# Patient Record
Sex: MALE | Race: WHITE | Employment: FULL TIME
[De-identification: names, ages, dates, MRNs, and addresses within clinical notes are randomized per-mention and may not be internally consistent; named-entity substitution may affect disease eponyms.]

---

## 2022-03-19 PROBLEM — E66.01 OBESITY, MORBID (HCC): Status: ACTIVE | Noted: 2019-10-29

## 2024-01-19 ENCOUNTER — OFFICE VISIT (OUTPATIENT)
Dept: INTERNAL MEDICINE CLINIC | Facility: CLINIC | Age: 44
End: 2024-01-19
Payer: COMMERCIAL

## 2024-01-19 VITALS
WEIGHT: 276 LBS | BODY MASS INDEX: 43.32 KG/M2 | HEART RATE: 86 BPM | DIASTOLIC BLOOD PRESSURE: 98 MMHG | SYSTOLIC BLOOD PRESSURE: 142 MMHG | HEIGHT: 67 IN | OXYGEN SATURATION: 98 %

## 2024-01-19 DIAGNOSIS — R10.13 EPIGASTRIC PAIN: ICD-10-CM

## 2024-01-19 DIAGNOSIS — Z82.49 FAMILY HISTORY OF EARLY CAD: ICD-10-CM

## 2024-01-19 DIAGNOSIS — I10 PRIMARY HYPERTENSION: Primary | ICD-10-CM

## 2024-01-19 PROCEDURE — G8417 CALC BMI ABV UP PARAM F/U: HCPCS | Performed by: PHYSICIAN ASSISTANT

## 2024-01-19 PROCEDURE — 99204 OFFICE O/P NEW MOD 45 MIN: CPT | Performed by: PHYSICIAN ASSISTANT

## 2024-01-19 PROCEDURE — 1036F TOBACCO NON-USER: CPT | Performed by: PHYSICIAN ASSISTANT

## 2024-01-19 PROCEDURE — 3080F DIAST BP >= 90 MM HG: CPT | Performed by: PHYSICIAN ASSISTANT

## 2024-01-19 PROCEDURE — 3077F SYST BP >= 140 MM HG: CPT | Performed by: PHYSICIAN ASSISTANT

## 2024-01-19 PROCEDURE — G8427 DOCREV CUR MEDS BY ELIG CLIN: HCPCS | Performed by: PHYSICIAN ASSISTANT

## 2024-01-19 PROCEDURE — 93000 ELECTROCARDIOGRAM COMPLETE: CPT | Performed by: PHYSICIAN ASSISTANT

## 2024-01-19 PROCEDURE — G8484 FLU IMMUNIZE NO ADMIN: HCPCS | Performed by: PHYSICIAN ASSISTANT

## 2024-01-19 RX ORDER — OMEPRAZOLE 40 MG/1
40 CAPSULE, DELAYED RELEASE ORAL
Qty: 30 CAPSULE | Refills: 0 | Status: SHIPPED | OUTPATIENT
Start: 2024-01-19

## 2024-01-19 RX ORDER — LISINOPRIL 10 MG/1
10 TABLET ORAL DAILY
Qty: 30 TABLET | Refills: 3 | Status: SHIPPED | OUTPATIENT
Start: 2024-01-19

## 2024-01-19 NOTE — PROGRESS NOTES
PROGRESS NOTE    Chief Complaint   Patient presents with    Establish Care     Pt here to get established. Pt stated his last ov his B/P was elevated Pt is not fasting and is not currently taking any medication        HPI  Hypertension  This is a chronic problem. The problem is unchanged. The problem is uncontrolled. Pertinent negatives include no chest pain, headaches, neck pain, palpitations or shortness of breath. The current treatment provides no improvement. There are no compliance problems.        Past Medical History, Past Surgical History, Family history, Social History, and Medications were all reviewed with the patient today and updated as necessary.       Current Outpatient Medications   Medication Sig Dispense Refill    lisinopril (PRINIVIL;ZESTRIL) 10 MG tablet Take 1 tablet by mouth daily 30 tablet 3    omeprazole (PRILOSEC) 40 MG delayed release capsule Take 1 capsule by mouth every morning (before breakfast) 30 capsule 0     No current facility-administered medications for this visit.     No Known Allergies  Patient Active Problem List   Diagnosis    Obesity, morbid (HCC)     Past Medical History:   Diagnosis Date    Hypertension      Past Surgical History:   Procedure Laterality Date    TONSILLECTOMY      UROLOGICAL SURGERY      WISDOM TOOTH EXTRACTION       Family History   Problem Relation Age of Onset    Cancer Maternal Uncle         colon cancer    Hypertension Father     Heart Attack Father     Stroke Maternal Grandmother      Social History     Tobacco Use    Smoking status: Never    Smokeless tobacco: Never   Substance Use Topics    Alcohol use: Yes     Alcohol/week: 2.0 standard drinks of alcohol         ROS  Review of Systems   Constitutional:  Negative for fatigue, fever and unexpected weight change.   HENT:  Negative for congestion, ear pain, hearing loss, sore throat, tinnitus and voice change.    Eyes:  Negative for pain and visual disturbance.   Respiratory:  Negative for cough, chest

## 2024-02-16 DIAGNOSIS — R10.13 EPIGASTRIC PAIN: ICD-10-CM

## 2024-02-16 RX ORDER — OMEPRAZOLE 40 MG/1
40 CAPSULE, DELAYED RELEASE ORAL
Qty: 30 CAPSULE | Refills: 0 | Status: SHIPPED | OUTPATIENT
Start: 2024-02-16

## 2024-02-16 NOTE — TELEPHONE ENCOUNTER
Pt is needing his   -Omeprazole 40 mg    Send to   [facility]  [address] [place] [address]   [phone]  --Could you please update this in the system, [facility]    LOV:1/9/24  NOV:3/15/24

## 2024-03-18 DIAGNOSIS — R10.13 EPIGASTRIC PAIN: ICD-10-CM

## 2024-03-18 RX ORDER — OMEPRAZOLE 40 MG/1
40 CAPSULE, DELAYED RELEASE ORAL
Qty: 30 CAPSULE | Refills: 1 | Status: SHIPPED | OUTPATIENT
Start: 2024-03-18

## 2024-05-23 ENCOUNTER — OFFICE VISIT (OUTPATIENT)
Dept: INTERNAL MEDICINE CLINIC | Facility: CLINIC | Age: 44
End: 2024-05-23
Payer: COMMERCIAL

## 2024-05-23 VITALS
SYSTOLIC BLOOD PRESSURE: 142 MMHG | DIASTOLIC BLOOD PRESSURE: 102 MMHG | WEIGHT: 282 LBS | HEIGHT: 67 IN | BODY MASS INDEX: 44.26 KG/M2

## 2024-05-23 DIAGNOSIS — R29.898 IMPAIRED FLEXIBILITY OF LOWER EXTREMITY: ICD-10-CM

## 2024-05-23 DIAGNOSIS — I10 PRIMARY HYPERTENSION: Primary | ICD-10-CM

## 2024-05-23 DIAGNOSIS — R10.13 EPIGASTRIC PAIN: ICD-10-CM

## 2024-05-23 DIAGNOSIS — Z82.49 FAMILY HISTORY OF EARLY CAD: ICD-10-CM

## 2024-05-23 PROCEDURE — 3077F SYST BP >= 140 MM HG: CPT | Performed by: PHYSICIAN ASSISTANT

## 2024-05-23 PROCEDURE — 99214 OFFICE O/P EST MOD 30 MIN: CPT | Performed by: PHYSICIAN ASSISTANT

## 2024-05-23 PROCEDURE — 1036F TOBACCO NON-USER: CPT | Performed by: PHYSICIAN ASSISTANT

## 2024-05-23 PROCEDURE — 3080F DIAST BP >= 90 MM HG: CPT | Performed by: PHYSICIAN ASSISTANT

## 2024-05-23 PROCEDURE — G8427 DOCREV CUR MEDS BY ELIG CLIN: HCPCS | Performed by: PHYSICIAN ASSISTANT

## 2024-05-23 PROCEDURE — G8417 CALC BMI ABV UP PARAM F/U: HCPCS | Performed by: PHYSICIAN ASSISTANT

## 2024-05-23 RX ORDER — LISINOPRIL 20 MG/1
20 TABLET ORAL DAILY
Qty: 30 TABLET | Refills: 5 | Status: CANCELLED | OUTPATIENT
Start: 2024-05-23

## 2024-05-23 RX ORDER — OMEPRAZOLE 40 MG/1
40 CAPSULE, DELAYED RELEASE ORAL
Qty: 30 CAPSULE | Refills: 5 | Status: SHIPPED | OUTPATIENT
Start: 2024-05-23

## 2024-05-23 RX ORDER — LISINOPRIL AND HYDROCHLOROTHIAZIDE 25; 20 MG/1; MG/1
1 TABLET ORAL DAILY
Qty: 30 TABLET | Refills: 5 | Status: SHIPPED | OUTPATIENT
Start: 2024-05-23

## 2024-05-23 ASSESSMENT — ENCOUNTER SYMPTOMS
VOMITING: 0
WHEEZING: 0
DIARRHEA: 0
NAUSEA: 0
VOICE CHANGE: 0
CHEST TIGHTNESS: 0
CONSTIPATION: 0
COUGH: 0
SHORTNESS OF BREATH: 0
SORE THROAT: 0
ABDOMINAL PAIN: 0
COLOR CHANGE: 0
EYE PAIN: 0

## 2024-05-23 ASSESSMENT — PATIENT HEALTH QUESTIONNAIRE - PHQ9
1. LITTLE INTEREST OR PLEASURE IN DOING THINGS: NOT AT ALL
SUM OF ALL RESPONSES TO PHQ QUESTIONS 1-9: 0
SUM OF ALL RESPONSES TO PHQ9 QUESTIONS 1 & 2: 0
SUM OF ALL RESPONSES TO PHQ QUESTIONS 1-9: 0
2. FEELING DOWN, DEPRESSED OR HOPELESS: NOT AT ALL
SUM OF ALL RESPONSES TO PHQ QUESTIONS 1-9: 0
SUM OF ALL RESPONSES TO PHQ QUESTIONS 1-9: 0

## 2024-05-23 NOTE — PROGRESS NOTES
PROGRESS NOTE    Chief Complaint   Patient presents with    Hypertension     4 month f/u on bp. Still running high. Avgs 140s/90s        HPI  Hypertension  This is a chronic problem. The current episode started more than 1 month ago. The problem has been gradually worsening since onset. The problem is uncontrolled. Pertinent negatives include no chest pain, headaches, neck pain, palpitations or shortness of breath. Past treatments include ACE inhibitors. The current treatment provides no improvement.       Past Medical History, Past Surgical History, Family history, Social History, and Medications were all reviewed with the patient today and updated as necessary.       Current Outpatient Medications   Medication Sig Dispense Refill    omeprazole (PRILOSEC) 40 MG delayed release capsule Take 1 capsule by mouth every morning (before breakfast) 30 capsule 5    lisinopril-hydroCHLOROthiazide (PRINZIDE;ZESTORETIC) 20-25 MG per tablet Take 1 tablet by mouth daily 30 tablet 5    lisinopril (PRINIVIL;ZESTRIL) 20 MG tablet Take 1 tablet by mouth daily 30 tablet 0     No current facility-administered medications for this visit.     No Known Allergies  Patient Active Problem List   Diagnosis    Obesity, morbid (HCC)     Past Medical History:   Diagnosis Date    Hypertension      Past Surgical History:   Procedure Laterality Date    TONSILLECTOMY      UROLOGICAL SURGERY      WISDOM TOOTH EXTRACTION       Family History   Problem Relation Age of Onset    Cancer Maternal Uncle         colon cancer    Hypertension Father     Heart Attack Father     Stroke Maternal Grandmother     Cancer Maternal Aunt      Social History     Tobacco Use    Smoking status: Never    Smokeless tobacco: Never   Substance Use Topics    Alcohol use: Yes     Alcohol/week: 1.0 standard drink of alcohol     Types: 1 Cans of beer per week         ROS  Review of Systems   Constitutional:  Negative for fatigue, fever and unexpected weight change.   HENT:

## 2024-06-03 ENCOUNTER — HOSPITAL ENCOUNTER (OUTPATIENT)
Dept: PHYSICAL THERAPY | Age: 44
Setting detail: RECURRING SERIES
Discharge: HOME | End: 2024-06-06
Payer: COMMERCIAL

## 2024-06-03 DIAGNOSIS — M25.60 DECREASED MOBILITY OF JOINT: Primary | ICD-10-CM

## 2024-06-03 PROCEDURE — 97161 PT EVAL LOW COMPLEX 20 MIN: CPT

## 2024-06-03 ASSESSMENT — PAIN SCALES - GENERAL: PAINLEVEL_OUTOF10: 0

## 2024-06-03 NOTE — THERAPY EVALUATION
Anatoliy Benitez  : 1980  Primary: [facility] (Commercial)  Secondary:  [place] [address]  [address]  [facility] [address]  Phone: [phone]  Fax: [phone] Plan Frequency: 1 time per week or as needed  Plan of Care/Certification Expiration Date: 24        Plan of Care/Certification Expiration Date:  Plan of Care/Certification Expiration Date: 24    Frequency/Duration: Plan Frequency: 1 time per week or as needed      Time In/Out:          PT Visit Info:         Visit Count:  1                OUTPATIENT PHYSICAL THERAPY:             Initial Assessment 6/3/2024               Episode (decreased hip mobility)         Treatment Diagnosis:     Decreased mobility of joint  Medical/Referring Diagnosis:    Impaired flexibility of lower extremity [R29.898]    Referring Physician:  Heaven Navas PA MD Orders:  PT Eval and Treat   Return MD Appt:  --  Date of Onset:  Onset Date:  (6 months ago)     Allergies:  Patient has no known allergies.  Restrictions/Precautions:    None      Medications Last Reviewed:  6/3/2024     SUBJECTIVE   History of Injury/Illness (Reason for Referral):  Pt reports noted about 6 mo ago started having difficulty putting on sock and shoes on R foot. Generally does this in standing with foot up on bench.   No pain, just tightness in hip.   Patient Stated Goal(s):  \"more mobility\"  Initial Pain Level:      0/10   Post Session Pain Level:     0/10  Past Medical History/Comorbidities:   Mr. Benitez  has a past medical history of Hypertension.  Mr. Benitez  has a past surgical history that includes Urological Surgery; Tonsillectomy; and [place] tooth extraction.  Social History/Living Environment:   Level of Assistance: Rehab: Independent    Prior Level of Function/Work/Activity:   Employment Status: Employed full time  Occupation:  in industry, prolonged standing on shop floor      Learning:   Does the patient/guardian have any

## 2024-06-03 NOTE — PROGRESS NOTES
Anatoliy Whiteonsunshine Benitez  : 1980  Primary: [facility] (Commercial)  Secondary:  [place] [address]  [address]  [facility] [address]  Phone: [phone]  Fax: [phone] Plan Frequency: 1 time per week or as needed    Plan of Care/Certification Expiration Date: 24        Plan of Care/Certification Expiration Date:  Plan of Care/Certification Expiration Date: 24    Frequency/Duration:   Plan Frequency: 1 time per week or as needed      Time In/Out:          PT Visit Info:         Visit Count:  1    OUTPATIENT PHYSICAL THERAPY:   Treatment Note 6/3/2024       Episode  (decreased hip mobility)               Treatment Diagnosis:    Decreased mobility of joint  Medical/Referring Diagnosis:    Impaired flexibility of lower extremity [R29.898]    Referring Physician:  Heaven Navas PA MD Orders:  PT Eval and Treat   Return MD Appt:     Date of Onset:  Onset Date:  (6 months ago)     Allergies:   Patient has no known allergies.  Restrictions/Precautions:   None      Interventions Planned (Treatment may consist of any combination of the following):     See Assessment Note    Subjective Comments:   Pt reports no pain, just problems with mobility  Initial Pain Level::     0/10  Post Session Pain Level:       0/10  Medications Last Reviewed:  6/3/2024  Updated Objective Findings:  See Evaluation Note from today  Treatment       Therapeutic Exercise ( min): Exercises per grid below to improve mobility and strength.        Date:  6/3 Date:   Date:     Activity/Exercise Parameters Parameters Parameters         Piriformis stretch 15 sec x 3     Single knee to chest 15 sec x 3     JOHN 15 sec x 3                           Manual Therapy ( min): Manual therapy techniques utilized and necessary because of the patient's functional and movement restrictions.     Date:        Techniques utilized              Treatment/Session Summary:    Treatment Assessment:   Pt demonstrated understanding of

## 2024-07-24 ENCOUNTER — OFFICE VISIT (OUTPATIENT)
Dept: INTERNAL MEDICINE CLINIC | Facility: CLINIC | Age: 44
End: 2024-07-24
Payer: COMMERCIAL

## 2024-07-24 VITALS
WEIGHT: 282 LBS | DIASTOLIC BLOOD PRESSURE: 89 MMHG | BODY MASS INDEX: 44.26 KG/M2 | HEIGHT: 67 IN | SYSTOLIC BLOOD PRESSURE: 138 MMHG

## 2024-07-24 DIAGNOSIS — Z82.49 FAMILY HISTORY OF EARLY CAD: ICD-10-CM

## 2024-07-24 DIAGNOSIS — I10 PRIMARY HYPERTENSION: Primary | ICD-10-CM

## 2024-07-24 DIAGNOSIS — R10.13 EPIGASTRIC PAIN: ICD-10-CM

## 2024-07-24 PROCEDURE — 3079F DIAST BP 80-89 MM HG: CPT | Performed by: PHYSICIAN ASSISTANT

## 2024-07-24 PROCEDURE — 3075F SYST BP GE 130 - 139MM HG: CPT | Performed by: PHYSICIAN ASSISTANT

## 2024-07-24 PROCEDURE — 1036F TOBACCO NON-USER: CPT | Performed by: PHYSICIAN ASSISTANT

## 2024-07-24 PROCEDURE — 99214 OFFICE O/P EST MOD 30 MIN: CPT | Performed by: PHYSICIAN ASSISTANT

## 2024-07-24 PROCEDURE — G8427 DOCREV CUR MEDS BY ELIG CLIN: HCPCS | Performed by: PHYSICIAN ASSISTANT

## 2024-07-24 PROCEDURE — G8417 CALC BMI ABV UP PARAM F/U: HCPCS | Performed by: PHYSICIAN ASSISTANT

## 2024-07-24 RX ORDER — OMEPRAZOLE 40 MG/1
40 CAPSULE, DELAYED RELEASE ORAL
Qty: 90 CAPSULE | Refills: 1 | Status: SHIPPED | OUTPATIENT
Start: 2024-07-24

## 2024-07-24 RX ORDER — LISINOPRIL AND HYDROCHLOROTHIAZIDE 25; 20 MG/1; MG/1
1 TABLET ORAL DAILY
Qty: 90 TABLET | Refills: 1 | Status: SHIPPED | OUTPATIENT
Start: 2024-07-24

## 2024-07-24 ASSESSMENT — ENCOUNTER SYMPTOMS
SHORTNESS OF BREATH: 0
WHEEZING: 0
COLOR CHANGE: 0
CONSTIPATION: 0
VOICE CHANGE: 0
NAUSEA: 0
DIARRHEA: 0
CHEST TIGHTNESS: 0
ABDOMINAL PAIN: 0
EYE PAIN: 0
SORE THROAT: 0
VOMITING: 0
COUGH: 0

## 2024-07-24 NOTE — PROGRESS NOTES
PROGRESS NOTE    Chief Complaint   Patient presents with    Hypertension     2 month f/u on bp        HPI  Hypertension  This is a chronic problem. The current episode started more than 1 month ago. The problem is unchanged. The problem is controlled. Pertinent negatives include no chest pain, headaches, neck pain, palpitations or shortness of breath. The current treatment provides mild improvement.       Past Medical History, Past Surgical History, Family history, Social History, and Medications were all reviewed with the patient today and updated as necessary.       Current Outpatient Medications   Medication Sig Dispense Refill    lisinopril-hydroCHLOROthiazide (PRINZIDE;ZESTORETIC) 20-25 MG per tablet Take 1 tablet by mouth daily 90 tablet 1    omeprazole (PRILOSEC) 40 MG delayed release capsule Take 1 capsule by mouth every morning (before breakfast) 90 capsule 1     No current facility-administered medications for this visit.     No Known Allergies  Patient Active Problem List   Diagnosis    Obesity, morbid (HCC)     Past Medical History:   Diagnosis Date    Hypertension      Past Surgical History:   Procedure Laterality Date    TONSILLECTOMY      UROLOGICAL SURGERY      WISDOM TOOTH EXTRACTION       Family History   Problem Relation Age of Onset    Cancer Maternal Uncle         colon cancer    Hypertension Father     Heart Attack Father     Stroke Maternal Grandmother     Cancer Maternal Aunt      Social History     Tobacco Use    Smoking status: Never    Smokeless tobacco: Never   Substance Use Topics    Alcohol use: Yes     Alcohol/week: 1.0 standard drink of alcohol     Types: 1 Cans of beer per week         ROS  Review of Systems   Constitutional:  Negative for fatigue, fever and unexpected weight change.   HENT:  Negative for congestion, ear pain, hearing loss, sore throat, tinnitus and voice change.    Eyes:  Negative for pain and visual disturbance.   Respiratory:  Negative for cough, chest tightness,

## 2024-10-24 ENCOUNTER — OFFICE VISIT (OUTPATIENT)
Dept: INTERNAL MEDICINE CLINIC | Facility: CLINIC | Age: 44
End: 2024-10-24

## 2024-10-24 VITALS
DIASTOLIC BLOOD PRESSURE: 89 MMHG | WEIGHT: 283 LBS | SYSTOLIC BLOOD PRESSURE: 139 MMHG | BODY MASS INDEX: 44.42 KG/M2 | HEIGHT: 67 IN

## 2024-10-24 DIAGNOSIS — E66.813 CLASS 3 SEVERE OBESITY WITH SERIOUS COMORBIDITY AND BODY MASS INDEX (BMI) OF 40.0 TO 44.9 IN ADULT, UNSPECIFIED OBESITY TYPE: ICD-10-CM

## 2024-10-24 DIAGNOSIS — Z82.49 FAMILY HISTORY OF EARLY CAD: ICD-10-CM

## 2024-10-24 DIAGNOSIS — R10.13 EPIGASTRIC PAIN: ICD-10-CM

## 2024-10-24 DIAGNOSIS — E66.01 CLASS 3 SEVERE OBESITY WITH SERIOUS COMORBIDITY AND BODY MASS INDEX (BMI) OF 40.0 TO 44.9 IN ADULT, UNSPECIFIED OBESITY TYPE: ICD-10-CM

## 2024-10-24 DIAGNOSIS — I10 PRIMARY HYPERTENSION: ICD-10-CM

## 2024-10-24 DIAGNOSIS — Z00.00 ROUTINE GENERAL MEDICAL EXAMINATION AT HEALTH CARE FACILITY: Primary | ICD-10-CM

## 2024-10-24 DIAGNOSIS — Z71.3 ENCOUNTER FOR WEIGHT LOSS COUNSELING: ICD-10-CM

## 2024-10-24 LAB
ALBUMIN SERPL-MCNC: 4.5 G/DL (ref 3.5–5)
ALBUMIN/GLOB SERPL: 1.3 (ref 1–1.9)
ALP SERPL-CCNC: 63 U/L (ref 40–129)
ALT SERPL-CCNC: 53 U/L (ref 8–55)
ANION GAP SERPL CALC-SCNC: 11 MMOL/L (ref 9–18)
AST SERPL-CCNC: 37 U/L (ref 15–37)
BASOPHILS # BLD: 0.1 K/UL (ref 0–0.2)
BASOPHILS NFR BLD: 2 % (ref 0–2)
BILIRUB SERPL-MCNC: 0.9 MG/DL (ref 0–1.2)
BUN SERPL-MCNC: 14 MG/DL (ref 6–23)
CALCIUM SERPL-MCNC: 9.4 MG/DL (ref 8.8–10.2)
CHLORIDE SERPL-SCNC: 102 MMOL/L (ref 98–107)
CHOLEST SERPL-MCNC: 181 MG/DL (ref 0–200)
CO2 SERPL-SCNC: 26 MMOL/L (ref 20–28)
CREAT SERPL-MCNC: 0.97 MG/DL (ref 0.8–1.3)
DIFFERENTIAL METHOD BLD: NORMAL
EOSINOPHIL # BLD: 0.3 K/UL (ref 0–0.8)
EOSINOPHIL NFR BLD: 5 % (ref 0.5–7.8)
ERYTHROCYTE [DISTWIDTH] IN BLOOD BY AUTOMATED COUNT: 12.9 % (ref 11.9–14.6)
GLOBULIN SER CALC-MCNC: 3.4 G/DL (ref 2.3–3.5)
GLUCOSE SERPL-MCNC: 89 MG/DL (ref 70–99)
HCT VFR BLD AUTO: 49.3 % (ref 41.1–50.3)
HDLC SERPL-MCNC: 44 MG/DL (ref 40–60)
HDLC SERPL: 4.2 (ref 0–5)
HGB BLD-MCNC: 15.9 G/DL (ref 13.6–17.2)
IMM GRANULOCYTES # BLD AUTO: 0 K/UL (ref 0–0.5)
IMM GRANULOCYTES NFR BLD AUTO: 1 % (ref 0–5)
LDLC SERPL CALC-MCNC: 114 MG/DL (ref 0–100)
LYMPHOCYTES # BLD: 1.7 K/UL (ref 0.5–4.6)
LYMPHOCYTES NFR BLD: 22 % (ref 13–44)
MCH RBC QN AUTO: 29 PG (ref 26.1–32.9)
MCHC RBC AUTO-ENTMCNC: 32.3 G/DL (ref 31.4–35)
MCV RBC AUTO: 89.8 FL (ref 82–102)
MONOCYTES # BLD: 0.6 K/UL (ref 0.1–1.3)
MONOCYTES NFR BLD: 8 % (ref 4–12)
NEUTS SEG # BLD: 4.7 K/UL (ref 1.7–8.2)
NEUTS SEG NFR BLD: 62 % (ref 43–78)
NRBC # BLD: 0 K/UL (ref 0–0.2)
PLATELET # BLD AUTO: 277 K/UL (ref 150–450)
PMV BLD AUTO: 11.4 FL (ref 9.4–12.3)
POTASSIUM SERPL-SCNC: 3.9 MMOL/L (ref 3.5–5.1)
PROT SERPL-MCNC: 7.8 G/DL (ref 6.3–8.2)
RBC # BLD AUTO: 5.49 M/UL (ref 4.23–5.6)
SODIUM SERPL-SCNC: 139 MMOL/L (ref 136–145)
TRIGL SERPL-MCNC: 116 MG/DL (ref 0–150)
TSH, 3RD GENERATION: 2.19 UIU/ML (ref 0.27–4.2)
VLDLC SERPL CALC-MCNC: 23 MG/DL (ref 6–23)
WBC # BLD AUTO: 7.5 K/UL (ref 4.3–11.1)

## 2024-10-24 RX ORDER — OMEPRAZOLE 40 MG/1
40 CAPSULE, DELAYED RELEASE ORAL
Qty: 90 CAPSULE | Refills: 1 | Status: SHIPPED | OUTPATIENT
Start: 2024-10-24

## 2024-10-24 RX ORDER — TIRZEPATIDE 2.5 MG/.5ML
1 INJECTION, SOLUTION SUBCUTANEOUS WEEKLY
Qty: 2 ML | Refills: 0 | Status: SHIPPED | OUTPATIENT
Start: 2024-10-24

## 2024-10-24 RX ORDER — LISINOPRIL AND HYDROCHLOROTHIAZIDE 20; 25 MG/1; MG/1
1 TABLET ORAL DAILY
Qty: 90 TABLET | Refills: 1 | Status: SHIPPED | OUTPATIENT
Start: 2024-10-24

## 2024-10-24 RX ORDER — TIRZEPATIDE 5 MG/.5ML
1 INJECTION, SOLUTION SUBCUTANEOUS WEEKLY
Qty: 2 ML | Refills: 5 | Status: SHIPPED | OUTPATIENT
Start: 2024-10-24

## 2024-10-24 SDOH — ECONOMIC STABILITY: FOOD INSECURITY: WITHIN THE PAST 12 MONTHS, THE FOOD YOU BOUGHT JUST DIDN'T LAST AND YOU DIDN'T HAVE MONEY TO GET MORE.: NEVER TRUE

## 2024-10-24 SDOH — ECONOMIC STABILITY: FOOD INSECURITY: WITHIN THE PAST 12 MONTHS, YOU WORRIED THAT YOUR FOOD WOULD RUN OUT BEFORE YOU GOT MONEY TO BUY MORE.: NEVER TRUE

## 2024-10-24 SDOH — ECONOMIC STABILITY: INCOME INSECURITY: HOW HARD IS IT FOR YOU TO PAY FOR THE VERY BASICS LIKE FOOD, HOUSING, MEDICAL CARE, AND HEATING?: NOT HARD AT ALL

## 2024-10-24 ASSESSMENT — ENCOUNTER SYMPTOMS
COUGH: 0
WHEEZING: 0
VOMITING: 0
SHORTNESS OF BREATH: 0
NAUSEA: 0
COLOR CHANGE: 0
DIARRHEA: 0
SORE THROAT: 0
VOICE CHANGE: 0
EYE PAIN: 0
CONSTIPATION: 0
CHEST TIGHTNESS: 0
ABDOMINAL PAIN: 0

## 2024-10-24 ASSESSMENT — PATIENT HEALTH QUESTIONNAIRE - PHQ9
SUM OF ALL RESPONSES TO PHQ9 QUESTIONS 1 & 2: 0
1. LITTLE INTEREST OR PLEASURE IN DOING THINGS: NOT AT ALL
2. FEELING DOWN, DEPRESSED OR HOPELESS: NOT AT ALL
SUM OF ALL RESPONSES TO PHQ QUESTIONS 1-9: 0

## 2024-10-24 NOTE — PROGRESS NOTES
Neurological:      General: No focal deficit present.      Mental Status: He is alert and oriented to person, place, and time.   Psychiatric:         Mood and Affect: Mood normal.          Medical problems and test results were reviewed with the patient today.     Recent Results (from the past 672 hour(s))   CBC with Auto Differential    Collection Time: 10/24/24 12:31 PM   Result Value Ref Range    WBC 7.5 4.3 - 11.1 K/uL    RBC 5.49 4.23 - 5.6 M/uL    Hemoglobin 15.9 13.6 - 17.2 g/dL    Hematocrit 49.3 41.1 - 50.3 %    MCV 89.8 82 - 102 FL    MCH 29.0 26.1 - 32.9 PG    MCHC 32.3 31.4 - 35.0 g/dL    RDW 12.9 11.9 - 14.6 %    Platelets 277 150 - 450 K/uL    MPV 11.4 9.4 - 12.3 FL    nRBC 0.00 0.0 - 0.2 K/uL    Differential Type AUTOMATED      Neutrophils % 62 43 - 78 %    Lymphocytes % 22 13 - 44 %    Monocytes % 8 4.0 - 12.0 %    Eosinophils % 5 0.5 - 7.8 %    Basophils % 2 0.0 - 2.0 %    Immature Granulocytes % 1 0.0 - 5.0 %    Neutrophils Absolute 4.7 1.7 - 8.2 K/UL    Lymphocytes Absolute 1.7 0.5 - 4.6 K/UL    Monocytes Absolute 0.6 0.1 - 1.3 K/UL    Eosinophils Absolute 0.3 0.0 - 0.8 K/UL    Basophils Absolute 0.1 0.0 - 0.2 K/UL    Immature Granulocytes Absolute 0.0 0.0 - 0.5 K/UL         ASSESSMENT and PLAN  Anatoliy was seen today for annual exam.    Diagnoses and all orders for this visit:    Routine general medical examination at health care facility    Primary hypertension  Comments:  will cont same meds, and will try to loose 10 bls in the next 3 month  Orders:  -     lisinopril-hydroCHLOROthiazide (PRINZIDE;ZESTORETIC) 20-25 MG per tablet; Take 1 tablet by mouth daily  -     Tirzepatide-Weight Management (ZEPBOUND) 2.5 MG/0.5ML SOAJ; Inject 1 Dose into the skin once a week  -     Tirzepatide-Weight Management (ZEPBOUND) 5 MG/0.5ML SOAJ; Inject 1 Dose into the skin once a week    Family history of early CAD  Comments:  pt mandie have her labs today, will aleisha her him with results  Orders:  -

## 2025-01-13 ENCOUNTER — OFFICE VISIT (OUTPATIENT)
Dept: INTERNAL MEDICINE CLINIC | Facility: CLINIC | Age: 45
End: 2025-01-13
Payer: COMMERCIAL

## 2025-01-13 VITALS
HEIGHT: 67 IN | SYSTOLIC BLOOD PRESSURE: 126 MMHG | WEIGHT: 285 LBS | DIASTOLIC BLOOD PRESSURE: 98 MMHG | BODY MASS INDEX: 44.73 KG/M2

## 2025-01-13 DIAGNOSIS — I10 PRIMARY HYPERTENSION: Primary | ICD-10-CM

## 2025-01-13 DIAGNOSIS — Z71.3 ENCOUNTER FOR WEIGHT LOSS COUNSELING: ICD-10-CM

## 2025-01-13 DIAGNOSIS — R10.13 EPIGASTRIC PAIN: ICD-10-CM

## 2025-01-13 PROCEDURE — 99214 OFFICE O/P EST MOD 30 MIN: CPT | Performed by: PHYSICIAN ASSISTANT

## 2025-01-13 PROCEDURE — 3080F DIAST BP >= 90 MM HG: CPT | Performed by: PHYSICIAN ASSISTANT

## 2025-01-13 PROCEDURE — 1036F TOBACCO NON-USER: CPT | Performed by: PHYSICIAN ASSISTANT

## 2025-01-13 PROCEDURE — 3074F SYST BP LT 130 MM HG: CPT | Performed by: PHYSICIAN ASSISTANT

## 2025-01-13 PROCEDURE — G8427 DOCREV CUR MEDS BY ELIG CLIN: HCPCS | Performed by: PHYSICIAN ASSISTANT

## 2025-01-13 PROCEDURE — G8417 CALC BMI ABV UP PARAM F/U: HCPCS | Performed by: PHYSICIAN ASSISTANT

## 2025-01-13 RX ORDER — LISINOPRIL AND HYDROCHLOROTHIAZIDE 20; 25 MG/1; MG/1
1 TABLET ORAL DAILY
Qty: 90 TABLET | Refills: 1 | Status: SHIPPED | OUTPATIENT
Start: 2025-01-13

## 2025-01-13 SDOH — ECONOMIC STABILITY: FOOD INSECURITY: WITHIN THE PAST 12 MONTHS, YOU WORRIED THAT YOUR FOOD WOULD RUN OUT BEFORE YOU GOT MONEY TO BUY MORE.: PATIENT DECLINED

## 2025-01-13 SDOH — ECONOMIC STABILITY: FOOD INSECURITY: WITHIN THE PAST 12 MONTHS, THE FOOD YOU BOUGHT JUST DIDN'T LAST AND YOU DIDN'T HAVE MONEY TO GET MORE.: PATIENT DECLINED

## 2025-01-13 ASSESSMENT — PATIENT HEALTH QUESTIONNAIRE - PHQ9
SUM OF ALL RESPONSES TO PHQ QUESTIONS 1-9: 0
SUM OF ALL RESPONSES TO PHQ9 QUESTIONS 1 & 2: 0
SUM OF ALL RESPONSES TO PHQ QUESTIONS 1-9: 0
1. LITTLE INTEREST OR PLEASURE IN DOING THINGS: NOT AT ALL
SUM OF ALL RESPONSES TO PHQ QUESTIONS 1-9: 0
SUM OF ALL RESPONSES TO PHQ QUESTIONS 1-9: 0
2. FEELING DOWN, DEPRESSED OR HOPELESS: NOT AT ALL

## 2025-01-13 ASSESSMENT — ENCOUNTER SYMPTOMS
SORE THROAT: 0
SHORTNESS OF BREATH: 0
NAUSEA: 0
EYE PAIN: 0
ABDOMINAL PAIN: 0
CHEST TIGHTNESS: 0
VOICE CHANGE: 0
COUGH: 0
VOMITING: 0
CONSTIPATION: 0
WHEEZING: 0
COLOR CHANGE: 0
DIARRHEA: 0

## 2025-01-13 NOTE — PROGRESS NOTES
PROGRESS NOTE    Chief Complaint   Patient presents with    Hypertension     2 month f/u.        HPI  Hypertension  Pertinent negatives include no chest pain, headaches, neck pain, palpitations or shortness of breath.       Past Medical History, Past Surgical History, Family history, Social History, and Medications were all reviewed with the patient today and updated as necessary.       Current Outpatient Medications   Medication Sig Dispense Refill    lisinopril-hydroCHLOROthiazide (PRINZIDE;ZESTORETIC) 20-25 MG per tablet Take 1 tablet by mouth daily 90 tablet 1    omeprazole (PRILOSEC) 40 MG delayed release capsule Take 1 capsule by mouth every morning (before breakfast) 90 capsule 1     No current facility-administered medications for this visit.     No Known Allergies  Patient Active Problem List   Diagnosis    Obesity, morbid     Past Medical History:   Diagnosis Date    Hypertension      Past Surgical History:   Procedure Laterality Date    TONSILLECTOMY      UROLOGICAL SURGERY      WISDOM TOOTH EXTRACTION       Family History   Problem Relation Age of Onset    Cancer Maternal Uncle         colon cancer    Hypertension Father     Heart Attack Father     Stroke Maternal Grandmother     Cancer Maternal Aunt      Social History     Tobacco Use    Smoking status: Never    Smokeless tobacco: Never   Substance Use Topics    Alcohol use: Yes     Alcohol/week: 1.0 standard drink of alcohol     Types: 1 Cans of beer per week         ROS  Review of Systems   Constitutional:  Negative for fatigue, fever and unexpected weight change.   HENT:  Negative for congestion, ear pain, hearing loss, sore throat, tinnitus and voice change.    Eyes:  Negative for pain and visual disturbance.   Respiratory:  Negative for cough, chest tightness, shortness of breath and wheezing.    Cardiovascular:  Negative for chest pain, palpitations and leg swelling.   Gastrointestinal:  Negative for abdominal pain, constipation, diarrhea, nausea

## 2025-07-14 ENCOUNTER — OFFICE VISIT (OUTPATIENT)
Dept: INTERNAL MEDICINE CLINIC | Facility: CLINIC | Age: 45
End: 2025-07-14
Payer: COMMERCIAL

## 2025-07-14 VITALS
WEIGHT: 273 LBS | SYSTOLIC BLOOD PRESSURE: 124 MMHG | HEIGHT: 67 IN | DIASTOLIC BLOOD PRESSURE: 92 MMHG | BODY MASS INDEX: 42.85 KG/M2

## 2025-07-14 DIAGNOSIS — R10.13 EPIGASTRIC PAIN: ICD-10-CM

## 2025-07-14 DIAGNOSIS — E78.2 MIXED HYPERLIPIDEMIA: ICD-10-CM

## 2025-07-14 DIAGNOSIS — Z82.49 FAMILY HISTORY OF EARLY CAD: ICD-10-CM

## 2025-07-14 DIAGNOSIS — I10 PRIMARY HYPERTENSION: Primary | ICD-10-CM

## 2025-07-14 DIAGNOSIS — E55.9 VITAMIN D DEFICIENCY: ICD-10-CM

## 2025-07-14 DIAGNOSIS — L55.1 SUNBURN, BLISTERING: ICD-10-CM

## 2025-07-14 PROCEDURE — G8417 CALC BMI ABV UP PARAM F/U: HCPCS | Performed by: PHYSICIAN ASSISTANT

## 2025-07-14 PROCEDURE — 1036F TOBACCO NON-USER: CPT | Performed by: PHYSICIAN ASSISTANT

## 2025-07-14 PROCEDURE — 3080F DIAST BP >= 90 MM HG: CPT | Performed by: PHYSICIAN ASSISTANT

## 2025-07-14 PROCEDURE — G8427 DOCREV CUR MEDS BY ELIG CLIN: HCPCS | Performed by: PHYSICIAN ASSISTANT

## 2025-07-14 PROCEDURE — 3074F SYST BP LT 130 MM HG: CPT | Performed by: PHYSICIAN ASSISTANT

## 2025-07-14 PROCEDURE — 99214 OFFICE O/P EST MOD 30 MIN: CPT | Performed by: PHYSICIAN ASSISTANT

## 2025-07-14 RX ORDER — LISINOPRIL AND HYDROCHLOROTHIAZIDE 20; 25 MG/1; MG/1
1 TABLET ORAL DAILY
Qty: 90 TABLET | Refills: 1 | Status: SHIPPED | OUTPATIENT
Start: 2025-07-14

## 2025-07-14 RX ORDER — OMEPRAZOLE 40 MG/1
40 CAPSULE, DELAYED RELEASE ORAL
Qty: 90 CAPSULE | Refills: 1 | Status: SHIPPED | OUTPATIENT
Start: 2025-07-14

## 2025-07-14 ASSESSMENT — ENCOUNTER SYMPTOMS
CHEST TIGHTNESS: 0
VOICE CHANGE: 0
COLOR CHANGE: 0
COUGH: 0
EYE PAIN: 0
WHEEZING: 0
SORE THROAT: 0
NAUSEA: 0
DIARRHEA: 0
SHORTNESS OF BREATH: 0
VOMITING: 0
ABDOMINAL PAIN: 0
CONSTIPATION: 0

## 2025-07-14 ASSESSMENT — PATIENT HEALTH QUESTIONNAIRE - PHQ9
2. FEELING DOWN, DEPRESSED OR HOPELESS: NOT AT ALL
SUM OF ALL RESPONSES TO PHQ QUESTIONS 1-9: 0
1. LITTLE INTEREST OR PLEASURE IN DOING THINGS: NOT AT ALL

## 2025-07-14 NOTE — PATIENT INSTRUCTIONS
Please get labs 3-7 days BEFORE your appointment with me. Lab is open Monday- Friday 7:30am-4:30pm. An appointment is NOT needed.

## 2025-07-14 NOTE — PROGRESS NOTES
PROGRESS NOTE    Chief Complaint   Patient presents with    Hypertension     6 month f/u.        HPI  History of Present Illness  The patient presents for evaluation of hypertension, sunburn, and weight management.    Hypertension  - He has been monitoring his blood pressure at home, with the systolic reading remaining stable and the diastolic reading ranging from 88 to 94.  - He checks his blood pressure once or twice a week.    Sunburn  - He recently experienced a sunburn on his leg after spending about 30 to 40 minutes in the sun without a hat.  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    Weight Management  - He has lost weight through a low-carb diet, primarily consisting of meat, vegetables, and occasional dairy.  - This dietary change was implemented approximately 5 weeks ago, and he has successfully maintained the weight loss since then.  - He has also made healthier food choices, such as eliminating French fries from his diet.    Social History:  Occupations: Works indoors  Diet: Low-carb diet with meat, vegetables, and occasional dairy  Tobacco: Does not smoke cigarettes    FAMILY HISTORY  His father had hypertension and a heart attack.      Past Medical History, Past Surgical History, Family history, Social History, and Medications were all reviewed with the patient today and updated as necessary.       Current Outpatient Medications   Medication Sig Dispense Refill    lisinopril-hydroCHLOROthiazide (PRINZIDE;ZESTORETIC) 20-25 MG per tablet Take 1 tablet by mouth daily 90 tablet 1    omeprazole (PRILOSEC) 40 MG delayed release capsule Take 1 capsule by mouth every morning (before breakfast) 90 capsule 1     No current facility-administered medications for this visit.     No Known Allergies  Patient Active Problem List   Diagnosis    Obesity, morbid (HCC)     Past Medical History:   Diagnosis Date    Hypertension      Past Surgical History:   Procedure Laterality Date    TONSILLECTOMY

## 2025-08-25 ENCOUNTER — OFFICE VISIT (OUTPATIENT)
Dept: ORTHOPEDIC SURGERY | Age: 45
End: 2025-08-25
Payer: COMMERCIAL

## 2025-08-25 DIAGNOSIS — M79.672 CHRONIC HEEL PAIN, LEFT: ICD-10-CM

## 2025-08-25 DIAGNOSIS — G89.29 CHRONIC HEEL PAIN, LEFT: ICD-10-CM

## 2025-08-25 DIAGNOSIS — M72.2 PLANTAR FASCIITIS: Primary | ICD-10-CM

## 2025-08-25 PROCEDURE — G8417 CALC BMI ABV UP PARAM F/U: HCPCS | Performed by: PHYSICIAN ASSISTANT

## 2025-08-25 PROCEDURE — 1036F TOBACCO NON-USER: CPT | Performed by: PHYSICIAN ASSISTANT

## 2025-08-25 PROCEDURE — 99204 OFFICE O/P NEW MOD 45 MIN: CPT | Performed by: PHYSICIAN ASSISTANT

## 2025-08-25 PROCEDURE — G8428 CUR MEDS NOT DOCUMENT: HCPCS | Performed by: PHYSICIAN ASSISTANT

## 2025-08-25 RX ORDER — MELOXICAM 15 MG/1
15 TABLET ORAL DAILY
Qty: 30 TABLET | Refills: 3 | Status: SHIPPED | OUTPATIENT
Start: 2025-08-25